# Patient Record
(demographics unavailable — no encounter records)

---

## 2024-11-26 NOTE — ASSESSMENT
[FreeTextEntry1] : Mr. ABHAY GO, 75 year old male, current smoker (1ppd since age 16, now cut down to 1/2 ppd), w/ hx of HTN, HLD, DMT2 on insulin, end stage CKD on HD (M-W-F) via Rt forearm AV Fistula, COPD, who presented with lung nodules, referred by Dr. Reinier Alas (Pulm).  CT Chest on 8/1/24 at [facility]: - Right middle lobe part solid irregular nodule measuring 0.9 x 0.7 cm (image 189/series 7) is stable from 2020. - New right lower lobe perivascular lobulated nodule measuring 2.2 x 1.6 cm (image 184-193/series 7). There is a lateral endobronchial component. - Bilateral apical thin-walled air cysts are stable and likely postinflammatory. - Mild centrilobular emphysema. - No enlarged mediastinal, hilar, or axillary nodes  Spirometry on 8/15/24: FVC 67.7%, FEV1 75%.  PET/CT on 9/10/24 at [facility]: - a hypermetabolic right lower lobe pulmonary nodule, which has increased in size since prior chest CT dated 7/21/2022. Currently, this nodule measures 1.9 x 1.5 cm, image 69 of the lung windows, and previously measured 1 x 0.6 cm, series 5, image 197. The standard uptake values of this activity range from 5.5-9.9. - There is a mildly hypermetabolic 0.9 opacity in the anteromedial right middle lobe, image 68 of the lung windows, which has not significantly changed in size since prior examinations.. The standard uptake values of this activity range from 1.1-2.4. - no evidence of enlarged mediastinal, hilar or axillary lymphadenopathy. No significant pleural or pericardial effusions are noted. Coronary artery calcifications are noted.  Now s/p FB, Rt VATS, R.A., wedge rxn of RML medial segmental lung nodule, RLL basilar segmentectomy, MLND on 11/12/24. 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 Subpleural fibroelastosis*** Path of RLL Invasive squamous cell carcinoma, keratinizing, 2.5 cm, G3, + Lymphatic invasion, vascular invasion is seen, all margins and LNs are negative, [id number], Stg IA3. ***Airway with involvement by squamous cell carcinoma in situ consistent with an contiguous to the invasive carcinoma***  CXR today----  I have reviewed the patient's medical records and diagnostic images at time of this office consultation and have made the following recommendation: 1.

## 2024-11-26 NOTE — REASON FOR VISIT
[de-identified] : FB, Rt VATS, R.A., wedge rxn of RML medial segmental lung nodule, RLL basilar segmentectomy, MLND [de-identified] : 11/12/24

## 2024-11-26 NOTE — CONSULT LETTER
[Dear  ___] : Dear  [unfilled], [Consult Letter:] : I had the pleasure of evaluating your patient, [unfilled]. [( Thank you for referring [unfilled] for consultation for _____ )] : Thank you for referring [unfilled] for consultation for [unfilled] [Please see my note below.] : Please see my note below. [Consult Closing:] : Thank you very much for allowing me to participate in the care of this patient.  If you have any questions, please do not hesitate to contact me. [Sincerely,] : Sincerely, [FreeTextEntry2] : Dr. Reinier Alas (Pulm) [FreeTextEntry3] : Benito Cordova MD, MPH  System Director of Thoracic Surgery  Director of Comprehensive Lung and Foregut [place]  Professor Cardiovascular & Thoracic Surgery   [facility] at [facility] [phone] [place] [address] [place] [address] Tel: [phone] Fax: [phone]

## 2024-12-05 NOTE — CONSULT LETTER
[FreeTextEntry2] : Dr. Reinier Alas (Pulm) [FreeTextEntry3] : Benito Cordova MD, MPH  System Director of Thoracic Surgery  Director of Comprehensive Lung and Foregut [place]  Professor Cardiovascular & Thoracic Surgery   [facility] at [facility] [address] [place] [address] [place] [address] Tel: [phone] Fax: [phone]

## 2024-12-05 NOTE — COUNSELING
[Hygeine (Including Daily Shower)] : hygeine (including daily shower) [Importance of Regular Medical Follow-Up] : the importance of regular medical follow-up [No Heavy Lifting] : no heavy lifting (>15-20 lb. for 1 month or 25 lb. for 3 months from date of surgery) [S/S of infection] : signs and symptoms of infection (and to whom it should be reported) [Progressive Ambulation/Activity] : progressive ambulation/activity [Medication/Vitamin/Herb/Food Interaction] : medication/vitamin/herb/food interaction

## 2024-12-05 NOTE — REASON FOR VISIT
[de-identified] : FB, Rt VATS, R.A., wedge rxn of RML medial segmental lung nodule, RLL basilar segmentectomy, MLND [de-identified] : 11/12/24

## 2024-12-05 NOTE — ASSESSMENT
[FreeTextEntry1] : Mr. ABHAY GO, 75 year old male, current smoker (1ppd since age 16, now cut down to 1/2 ppd), w/ hx of HTN, HLD, DMT2 on insulin, end stage CKD on HD (M-W-F) via Rt forearm AV Fistula, COPD, who presented with lung nodules, referred by Dr. Reinier Alas (Pulm).  CT Chest on 8/1/24 at [facility]: - Right middle lobe part solid irregular nodule measuring 0.9 x 0.7 cm (image 189/series 7) is stable from 2020. - New right lower lobe perivascular lobulated nodule measuring 2.2 x 1.6 cm (image 184-193/series 7). There is a lateral endobronchial component. - Bilateral apical thin-walled air cysts are stable and likely postinflammatory. - Mild centrilobular emphysema. - No enlarged mediastinal, hilar, or axillary nodes  Spirometry on 8/15/24: FVC 67.7%, FEV1 75%.  PET/CT on 9/10/24 at [facility]: - a hypermetabolic right lower lobe pulmonary nodule, which has increased in size since prior chest CT dated 7/21/2022. Currently, this nodule measures 1.9 x 1.5 cm, image 69 of the lung windows, and previously measured 1 x 0.6 cm, series 5, image 197. The standard uptake values of this activity range from 5.5-9.9. - There is a mildly hypermetabolic 0.9 opacity in the anteromedial right middle lobe, image 68 of the lung windows, which has not significantly changed in size since prior examinations.. The standard uptake values of this activity range from 1.1-2.4. - no evidence of enlarged mediastinal, hilar or axillary lymphadenopathy. No significant pleural or pericardial effusions are noted. Coronary artery calcifications are noted.  Now s/p FB, Rt VATS, R.A., wedge rxn of RML medial segmental lung nodule, RLL basilar segmentectomy, MLND on 11/12/24. 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 ***Airway with involvement by squamous cell carcinoma in situ consistent with an contiguous to the invasive carcinoma***  CXR 12/2/24: no acute disease. He admits SOB on exertion, denies fever, chills, cough.   I have reviewed the patient's medical records and diagnostic images at time of this office consultation and have made the following recommendation: 1. Pathology report reviewed with pt today, stage IA lung Ca.  2. RTC in 6 months with CT chest w/o contrast.      I, Dr. SON, XAVIER DORMAN, personally performed the evaluation and management (E/M) services for this established patient who follow up today with an existing condition.  That E/M includes conducting the examination, assessing all new/exacerbated/existing conditions, and establishing a plan of care. Today, my ACP, Marely Howard NP, was here to observe my evaluation and management services for this existing condition to be followed going forward.

## 2024-12-05 NOTE — PHYSICAL EXAM
[] : no respiratory distress [Respiration, Rhythm And Depth] : normal respiratory rhythm and effort [Exaggerated Use Of Accessory Muscles For Inspiration] : no accessory muscle use [Auscultation Breath Sounds / Voice Sounds] : lungs were clear to auscultation bilaterally [Apical Impulse] : the apical impulse was normal [Heart Rate And Rhythm] : heart rate was normal and rhythm regular [Heart Sounds] : normal S1 and S2 [Site: ___] : Site: [unfilled] [Clean] : clean [Dry] : dry [Healing Well] : healing well [Bleeding] : no active bleeding [Foul Odor] : no foul smell [Purulent Drainage] : no purulent drainage [Serosanguinous Drainage] : no serosanguinous drainage [Erythema] : not erythematous [Warm] : not warm [Tender] : not tender [No Edema] : no edema

## 2024-12-05 NOTE — ASSESSMENT
[FreeTextEntry1] : Mr. ABHAY GO, 75 year old male, current smoker (1ppd since age 16, now cut down to 1/2 ppd), w/ hx of HTN, HLD, DMT2 on insulin, end stage CKD on HD (M-W-F) via Rt forearm AV Fistula, COPD, who presented with lung nodules, referred by Dr. Reinier Alas (Pulm).  CT Chest on 8/1/24 at [facility]: - Right middle lobe part solid irregular nodule measuring 0.9 x 0.7 cm (image 189/series 7) is stable from 2020. - New right lower lobe perivascular lobulated nodule measuring 2.2 x 1.6 cm (image 184-193/series 7). There is a lateral endobronchial component. - Bilateral apical thin-walled air cysts are stable and likely postinflammatory. - Mild centrilobular emphysema. - No enlarged mediastinal, hilar, or axillary nodes  Spirometry on 8/15/24: FVC 67.7%, FEV1 75%.  PET/CT on 9/10/24 at [facility]: - a hypermetabolic right lower lobe pulmonary nodule, which has increased in size since prior chest CT dated 7/21/2022. Currently, this nodule measures 1.9 x 1.5 cm, image 69 of the lung windows, and previously measured 1 x 0.6 cm, series 5, image 197. The standard uptake values of this activity range from 5.5-9.9. - There is a mildly hypermetabolic 0.9 opacity in the anteromedial right middle lobe, image 68 of the lung windows, which has not significantly changed in size since prior examinations.. The standard uptake values of this activity range from 1.1-2.4. - no evidence of enlarged mediastinal, hilar or axillary lymphadenopathy. No significant pleural or pericardial effusions are noted. Coronary artery calcifications are noted.  Now s/p FB, Rt VATS, R.A., wedge rxn of RML medial segmental lung nodule, RLL basilar segmentectomy, MLND on 11/12/24. 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 Subpleural fibroelastosis*** Path of RLL Invasive squamous cell carcinoma, keratinizing, 2.5 cm, G3, + Lymphatic invasion, vascular invasion is seen, all margins and LNs are negative, [id number], Stg IA3. ***Airway with involvement by squamous cell carcinoma in situ consistent with an contiguous to the invasive carcinoma***  CXR 12/2/24: no acute disease. He admits SOB on exertion, denies fever, chills, cough.   I have reviewed the patient's medical records and diagnostic images at time of this office consultation and have made the following recommendation: 1. Pathology report reviewed with pt today, stage IA lung Ca.  2. RTC in 6 months with CT chest w/o contrast.      I, Dr. SON, XAVIER DORMAN, personally performed the evaluation and management (E/M) services for this established patient who follow up today with an existing condition.  That E/M includes conducting the examination, assessing all new/exacerbated/existing conditions, and establishing a plan of care. Today, my ACP, Marely Howard NP, was here to observe my evaluation and management services for this existing condition to be followed going forward.

## 2024-12-05 NOTE — REASON FOR VISIT
[de-identified] : FB, Rt VATS, R.A., wedge rxn of RML medial segmental lung nodule, RLL basilar segmentectomy, MLND [de-identified] : 11/12/24 2

## 2024-12-05 NOTE — CONSULT LETTER
[FreeTextEntry2] : Dr. Reinier Alas (Pulm) [FreeTextEntry3] : Benito Cordova MD, MPH  System Director of Thoracic Surgery  Director of Comprehensive Lung and Foregut [place]  Professor Cardiovascular & Thoracic Surgery   [facility] at [facility] [phone] [place] [address] [place] [address] Tel: [phone] Fax: [phone]

## 2025-06-13 NOTE — HISTORY OF PRESENT ILLNESS
[FreeTextEntry1] : Mr. ABHAY GO, 76 year old male, current smoker (1ppd since age 16, now cut down to 1/2 ppd), w/ hx of HTN, HLD, DMT2 on insulin, end stage CKD on HD (M-W-F) via Rt forearm AV Fistula, COPD, who presented with lung nodules, referred by Dr. Reinier Alas (Pulm).  CT Chest on 8/1/24 at [facility]: - Right middle lobe part solid irregular nodule measuring 0.9 x 0.7 cm (image 189/series 7) is stable from 2020. - New right lower lobe perivascular lobulated nodule measuring 2.2 x 1.6 cm (image 184-193/series 7). There is a lateral endobronchial component. - Bilateral apical thin-walled air cysts are stable and likely postinflammatory. - Mild centrilobular emphysema. - No enlarged mediastinal, hilar, or axillary nodes  Spirometry on 8/15/24: FVC 67.7%, FEV1 75%.  PET/CT on 9/10/24 at [facility]: - a hypermetabolic right lower lobe pulmonary nodule, which has increased in size since prior chest CT dated 7/21/2022. Currently, this nodule measures 1.9 x 1.5 cm, image 69 of the lung windows, and previously measured 1 x 0.6 cm, series 5, image 197. The standard uptake values of this activity range from 5.5-9.9. - There is a mildly hypermetabolic 0.9 opacity in the anteromedial right middle lobe, image 68 of the lung windows, which has not significantly changed in size since prior examinations.. The standard uptake values of this activity range from 1.1-2.4. - no evidence of enlarged mediastinal, hilar or axillary lymphadenopathy. No significant pleural or pericardial effusions are noted. Coronary artery calcifications are noted.  Now 6 mons s/p FB, Rt VATS, R.A., wedge rxn of RML medial segmental lung nodule, RLL basilar segmentectomy, MLND on 11/12/24. Path revealed RML Invasive acinar adenocarcinoma, 9 mm, G2, margins and LNs are negative, [id number], Stg 1A1 ***Lung tissue with smoking-related interstitial fibrosis, emphysema, and respiratory bronchiolitis. Subpleural fibroelastosis*** Path of RLL Invasive squamous cell carcinoma, keratinizing, 2.5 cm, G3, + Lymphatic invasion, vascular invasion is seen, all margins and LNs are negative, [id number], Stg IA3. ***Airway with involvement by squamous cell carcinoma in situ consistent with an contiguous to the invasive carcinoma***  CT Chest on 6/9/25 at [facility]: - Expected postoperative changes with residual scarring, fibrosis and volume loss involving the medial RML and anterobasal  RLL tumors.  - Mild right pleural effusion.  - Minor peripheral reticular opacities-scarring in the residual right mid to lower lung zone.  - Stable biapical emphysema with centrilobular and bullous septal components.  - Minor reticular scarring remains in the LLL. - Minimal linear secretory material-synechia in the lower trachea and left main bronchi. - Heart size remains normal with a large burden of multivessel coronary artery calcifications.  - Stable right axillary vascular stent in situ. Normal caliber main pulmonary artery and mildly calcified thoracic aorta-great arch vessels. - Cholecystectomy clips, left greater than right diffuse adrenal hyperplasia and a hyperdense distal right common bile duct 1.1 cm obstructive calculus (S2/103) remain without significant change.  Pt presents today for follow up. Today, patient denies worsening SOB, chest pain, cough, hemoptysis, fever, chills, night sweats, lightheadedness or dizziness.

## 2025-06-13 NOTE — PHYSICAL EXAM
[Heart Rate And Rhythm] : heart rate was normal and rhythm regular [Examination Of The Chest] : the chest was normal in appearance [Chest Visual Inspection Thoracic Asymmetry] : no chest asymmetry [Diminished Respiratory Excursion] : normal chest expansion [2+] : left 2+ [Cervical Lymph Nodes Enlarged Posterior Bilaterally] : posterior cervical [Cervical Lymph Nodes Enlarged Anterior Bilaterally] : anterior cervical [Supraclavicular Lymph Nodes Enlarged Bilaterally] : supraclavicular [Involuntary Movements] : no involuntary movements were seen [Skin Color & Pigmentation] : normal skin color and pigmentation [Oriented To Time, Place, And Person] : oriented to person, place, and time

## 2025-06-13 NOTE — CONSULT LETTER
[Dear  ___] : Dear  [unfilled], [Consult Letter:] : I had the pleasure of evaluating your patient, [unfilled]. [( Thank you for referring [unfilled] for consultation for _____ )] : Thank you for referring [unfilled] for consultation for [unfilled] [Please see my note below.] : Please see my note below. [Consult Closing:] : Thank you very much for allowing me to participate in the care of this patient.  If you have any questions, please do not hesitate to contact me. [Sincerely,] : Sincerely, [FreeTextEntry2] : Dr. Reinier Alas (Pulm) [FreeTextEntry3] : Benito Cordova MD, MPH  System Director of Thoracic Surgery  Director of Comprehensive Lung and Foregut [place]  Professor Cardiovascular & Thoracic Surgery   [facility] at [facility] [phone] [place] [address] [place] [address] Tel: [phone] Fax: [phone]

## 2025-06-13 NOTE — HISTORY OF PRESENT ILLNESS
[FreeTextEntry1] : Mr. ABHAY GO, 76 year old male, current smoker (1ppd since age 16, now cut down to 1/2 ppd), w/ hx of HTN, HLD, DMT2 on insulin, end stage CKD on HD (M-W-F) via Rt forearm AV Fistula, COPD, who presented with lung nodules, referred by Dr. Reinier Alas (Pulm).  CT Chest on 8/1/24 at [facility]: - Right middle lobe part solid irregular nodule measuring 0.9 x 0.7 cm (image 189/series 7) is stable from 2020. - New right lower lobe perivascular lobulated nodule measuring 2.2 x 1.6 cm (image 184-193/series 7). There is a lateral endobronchial component. - Bilateral apical thin-walled air cysts are stable and likely postinflammatory. - Mild centrilobular emphysema. - No enlarged mediastinal, hilar, or axillary nodes  Spirometry on 8/15/24: FVC 67.7%, FEV1 75%.  PET/CT on 9/10/24 at [facility]: - a hypermetabolic right lower lobe pulmonary nodule, which has increased in size since prior chest CT dated 7/21/2022. Currently, this nodule measures 1.9 x 1.5 cm, image 69 of the lung windows, and previously measured 1 x 0.6 cm, series 5, image 197. The standard uptake values of this activity range from 5.5-9.9. - There is a mildly hypermetabolic 0.9 opacity in the anteromedial right middle lobe, image 68 of the lung windows, which has not significantly changed in size since prior examinations.. The standard uptake values of this activity range from 1.1-2.4. - no evidence of enlarged mediastinal, hilar or axillary lymphadenopathy. No significant pleural or pericardial effusions are noted. Coronary artery calcifications are noted.  Now 6 mons s/p FB, Rt VATS, R.A., wedge rxn of RML medial segmental lung nodule, RLL basilar segmentectomy, MLND on 11/12/24. Path revealed RML Invasive acinar adenocarcinoma, 9 mm, G2, margins and LNs are negative, [id number], Stg 1A1 ***Lung tissue with smoking-related interstitial fibrosis, emphysema, and respiratory bronchiolitis. Subpleural fibroelastosis*** Path of RLL Invasive squamous cell carcinoma, keratinizing, 2.5 cm, G3, + Lymphatic invasion, vascular invasion is seen, all margins and LNs are negative, [id number], Stg IA3. ***Airway with involvement by squamous cell carcinoma in situ consistent with an contiguous to the invasive carcinoma***  CT Chest on 6/9/25 at [facility]: - Expected postoperative changes with residual scarring, fibrosis and volume loss involving the medial RML and anterobasal  RLL tumors.  - Mild right pleural effusion.  - Minor peripheral reticular opacities-scarring in the residual right mid to lower lung zone.  - Stable biapical emphysema with centrilobular and bullous septal components.  - Minor reticular scarring remains in the LLL. - Minimal linear secretory material-synechia in the lower trachea and left main bronchi. - Heart size remains normal with a large burden of multivessel coronary artery calcifications.  - Stable right axillary vascular stent in situ. Normal caliber main pulmonary artery and mildly calcified thoracic aorta-great arch vessels. - Cholecystectomy clips, left greater than right diffuse adrenal hyperplasia and a hyperdense distal right common bile duct 1.1 cm obstructive calculus (S2/103) remain without significant change.  Pt presents today for follow up. Today, patient denies worsening SOB, chest pain, cough, hemoptysis, fever, chills, night sweats, lightheadedness or dizziness.

## 2025-06-13 NOTE — CONSULT LETTER
[Dear  ___] : Dear  [unfilled], [Consult Letter:] : I had the pleasure of evaluating your patient, [unfilled]. [( Thank you for referring [unfilled] for consultation for _____ )] : Thank you for referring [unfilled] for consultation for [unfilled] [Please see my note below.] : Please see my note below. [Consult Closing:] : Thank you very much for allowing me to participate in the care of this patient.  If you have any questions, please do not hesitate to contact me. [Sincerely,] : Sincerely, [FreeTextEntry2] : Dr. Reinier Alas (Pulm) [FreeTextEntry3] : Benito Cordova MD, MPH  System Director of Thoracic Surgery  Director of Comprehensive Lung and Foregut [place]  Professor Cardiovascular & Thoracic Surgery   [facility] at [facility] [phone] [place] [address] [place] [address] Tel: [phone] Fax: [phone]

## 2025-06-13 NOTE — ASSESSMENT
[FreeTextEntry1] : Mr. ABHAY GO, 76 year old male, current smoker (1ppd since age 16, now cut down to 1/2 ppd), w/ hx of HTN, HLD, DMT2 on insulin, end stage CKD on HD (M-W-F) via Rt forearm AV Fistula, COPD, who presented with lung nodules, referred by Dr. Reinier Alas (Pulm).  Now 6 mons s/p FB, Rt VATS, R.A., wedge rxn of RML medial segmental lung nodule, RLL basilar segmentectomy, MLND on 11/12/24. Path revealed RML Invasive acinar adenocarcinoma, 9 mm, G2, margins and LNs are negative, [id number], Stg 1A1 ***Lung tissue with smoking-related interstitial fibrosis, emphysema, and respiratory bronchiolitis. Subpleural fibroelastosis*** Path of RLL Invasive squamous cell carcinoma, keratinizing, 2.5 cm, G3, + Lymphatic invasion, vascular invasion is seen, all margins and LNs are negative, [id number], Stg IA3. ***Airway with involvement by squamous cell carcinoma in situ consistent with an contiguous to the invasive carcinoma***  CT Chest on 6/9/25 at [facility]: - Expected postoperative changes with residual scarring, fibrosis and volume loss involving the medial RML and anterobasal  RLL tumors.  - Mild right pleural effusion.  - Minor peripheral reticular opacities-scarring in the residual right mid to lower lung zone.  - Stable biapical emphysema with centrilobular and bullous septal components.  - Minor reticular scarring remains in the LLL. - Minimal linear secretory material-synechia in the lower trachea and left main bronchi. - Heart size remains normal with a large burden of multivessel coronary artery calcifications.  - Stable right axillary vascular stent in situ. Normal caliber main pulmonary artery and mildly calcified thoracic aorta-great arch vessels. - Cholecystectomy clips, left greater than right diffuse adrenal hyperplasia and a hyperdense distal right common bile duct 1.1 cm obstructive calculus (S2/103) remain without significant change.  I have reviewed the patient's medical records and diagnostic images at time of this office consultation and have made the following recommendation: 1. CT imaging reviewed; Stable post op lung findings. Recommendation to return to clinic in 6 months with repeat non contrast CT Chest. Instructed to return to clinic 1-2 weeks following scan to discuss results and further plan of care. Patient is agreeable.  2. Follow up with PCP regarding cardiac and abdomen findings.   Recommendations reviewed with patient during this office visit, and all questions answered; Patient instructed on the importance of follow up and verbalizes understanding.  I, XAVIER Hill, personally performed the evaluation and management (E/M) services for this established patient. That E/M includes conducting the examination, assessing all new/exacerbated conditions, and establishing a new plan of care. Today, My ACP, Savita Westfall, was here to observe my evaluation and management services for this patient to be followed going forward.

## 2025-06-13 NOTE — ASSESSMENT
[FreeTextEntry1] : Mr. ABHAY GO, 76 year old male, current smoker (1ppd since age 16, now cut down to 1/2 ppd), w/ hx of HTN, HLD, DMT2 on insulin, end stage CKD on HD (M-W-F) via Rt forearm AV Fistula, COPD, who presented with lung nodules, referred by Dr. Reinier Alas (Pulm).  Now 6 mons s/p FB, Rt VATS, R.A., wedge rxn of RML medial segmental lung nodule, RLL basilar segmentectomy, MLND on 11/12/24. 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 Subpleural fibroelastosis*** Path of RLL Invasive squamous cell carcinoma, keratinizing, 2.5 cm, G3, + Lymphatic invasion, vascular invasion is seen, all margins and LNs are negative, [id number], Stg IA3. ***Airway with involvement by squamous cell carcinoma in situ consistent with an contiguous to the invasive carcinoma***  CT Chest on 6/9/25 at [facility]: - Expected postoperative changes with residual scarring, fibrosis and volume loss involving the medial RML and anterobasal  RLL tumors.  - Mild right pleural effusion.  - Minor peripheral reticular opacities-scarring in the residual right mid to lower lung zone.  - Stable biapical emphysema with centrilobular and bullous septal components.  - Minor reticular scarring remains in the LLL. - Minimal linear secretory material-synechia in the lower trachea and left main bronchi. - Heart size remains normal with a large burden of multivessel coronary artery calcifications.  - Stable right axillary vascular stent in situ. Normal caliber main pulmonary artery and mildly calcified thoracic aorta-great arch vessels. - Cholecystectomy clips, left greater than right diffuse adrenal hyperplasia and a hyperdense distal right common bile duct 1.1 cm obstructive calculus (S2/103) remain without significant change.  I have reviewed the patient's medical records and diagnostic images at time of this office consultation and have made the following recommendation: 1. CT imaging reviewed; Stable post op lung findings. Recommendation to return to clinic in 6 months with repeat non contrast CT Chest. Instructed to return to clinic 1-2 weeks following scan to discuss results and further plan of care. Patient is agreeable.  2. Follow up with PCP regarding cardiac and abdomen findings.   Recommendations reviewed with patient during this office visit, and all questions answered; Patient instructed on the importance of follow up and verbalizes understanding.  I, XAVIER Hill, personally performed the evaluation and management (E/M) services for this established patient. That E/M includes conducting the examination, assessing all new/exacerbated conditions, and establishing a new plan of care. Today, My ACP, Savita Westfall, was here to observe my evaluation and management services for this patient to be followed going forward.